# Patient Record
Sex: FEMALE | Race: WHITE | NOT HISPANIC OR LATINO | Employment: FULL TIME | URBAN - METROPOLITAN AREA
[De-identification: names, ages, dates, MRNs, and addresses within clinical notes are randomized per-mention and may not be internally consistent; named-entity substitution may affect disease eponyms.]

---

## 2019-05-16 ENCOUNTER — OFFICE VISIT (OUTPATIENT)
Dept: URGENT CARE | Facility: CLINIC | Age: 34
End: 2019-05-16
Payer: COMMERCIAL

## 2019-05-16 VITALS
HEIGHT: 59 IN | TEMPERATURE: 99 F | WEIGHT: 115 LBS | RESPIRATION RATE: 18 BRPM | DIASTOLIC BLOOD PRESSURE: 98 MMHG | HEART RATE: 89 BPM | BODY MASS INDEX: 23.18 KG/M2 | SYSTOLIC BLOOD PRESSURE: 136 MMHG | OXYGEN SATURATION: 94 %

## 2019-05-16 DIAGNOSIS — J45.21 MILD INTERMITTENT ASTHMA WITH ACUTE EXACERBATION: Primary | ICD-10-CM

## 2019-05-16 DIAGNOSIS — J01.00 ACUTE NON-RECURRENT MAXILLARY SINUSITIS: ICD-10-CM

## 2019-05-16 PROCEDURE — 94640 PR INHAL RX, AIRWAY OBST/DX SPUTUM INDUCT: ICD-10-PCS | Mod: S$GLB,,, | Performed by: FAMILY MEDICINE

## 2019-05-16 PROCEDURE — 94640 AIRWAY INHALATION TREATMENT: CPT | Mod: S$GLB,,, | Performed by: FAMILY MEDICINE

## 2019-05-16 PROCEDURE — 99214 PR OFFICE/OUTPT VISIT, EST, LEVL IV, 30-39 MIN: ICD-10-PCS | Mod: 25,S$GLB,, | Performed by: FAMILY MEDICINE

## 2019-05-16 PROCEDURE — 99214 OFFICE O/P EST MOD 30 MIN: CPT | Mod: 25,S$GLB,, | Performed by: FAMILY MEDICINE

## 2019-05-16 RX ORDER — ALBUTEROL SULFATE 0.83 MG/ML
2.5 SOLUTION RESPIRATORY (INHALATION)
Status: COMPLETED | OUTPATIENT
Start: 2019-05-16 | End: 2019-05-16

## 2019-05-16 RX ORDER — METHYLPREDNISOLONE 4 MG/1
TABLET ORAL
Qty: 21 TABLET | Refills: 0 | Status: SHIPPED | OUTPATIENT
Start: 2019-05-16

## 2019-05-16 RX ORDER — AMOXICILLIN 500 MG/1
1000 TABLET, FILM COATED ORAL EVERY 12 HOURS
Qty: 40 TABLET | Refills: 0 | Status: SHIPPED | OUTPATIENT
Start: 2019-05-16 | End: 2019-05-26

## 2019-05-16 RX ORDER — IPRATROPIUM BROMIDE 0.5 MG/2.5ML
0.5 SOLUTION RESPIRATORY (INHALATION)
Status: COMPLETED | OUTPATIENT
Start: 2019-05-16 | End: 2019-05-16

## 2019-05-16 RX ORDER — BUDESONIDE AND FORMOTEROL FUMARATE DIHYDRATE 160; 4.5 UG/1; UG/1
2 AEROSOL RESPIRATORY (INHALATION) 2 TIMES DAILY
Qty: 1 INHALER | Refills: 0 | Status: SHIPPED | OUTPATIENT
Start: 2019-05-16 | End: 2019-06-15

## 2019-05-16 RX ADMIN — IPRATROPIUM BROMIDE 0.5 MG: 0.5 SOLUTION RESPIRATORY (INHALATION) at 03:05

## 2019-05-16 RX ADMIN — ALBUTEROL SULFATE 2.5 MG: 0.83 SOLUTION RESPIRATORY (INHALATION) at 03:05

## 2019-05-16 NOTE — PATIENT INSTRUCTIONS
Understanding Asthma Triggers  Triggers are things that cause you to have asthma symptoms. Some triggers you can stay away from completely. Others you can plan for and adjust to. Use this sheet to help you know your triggers.    What are triggers?  Triggers irritate your lungs and lead to asthma flare-ups. Some examples are:  · Irritants, such as tobacco smoke or air pollution. These are a concern for all people with asthma.  · Allergens or substances that cause allergies, like pets, dust mites, or pollen  · Special conditions. These include being ill with a cold or the flu, or certain kinds of weather, including changes in weather. These differ from person to person.  · Exercise can trigger asthma in some people. If exercise is one of your triggers, you can learn how to exercise safely.  What triggers your asthma?  Which of these common triggers cause your asthma to flare up? Check all that apply to you.  Irritants:  ? Tobacco smoke (smoking or secondhand smoke)  ? Smoke from fireplaces  ? Vehicle exhaust  ? Smog or air pollution  ? Aerosol sprays  ? Strong odors, such as perfume, incense, or cooking odors  ? Household , such as ammonia or bleach  Allergens:  ? Cats  ? Dogs  ? Birds  ? Dust or dust mites  ? Pollen  ? Mold  ? Cockroaches  Other triggers:  ? Cold air  ? Hot air  ? Weather changes  ? Exercise  ? Certain foods or food ingredients (such as sulfites)  ? Medicines  ? Emotions such as laughing, crying, or feeling stressed  ? Illness such as colds, flu, and sinus infections  Allergies and allergy treatment  People with asthma often have allergies. If you have allergies, or think you have them, talk with your healthcare provider about testing and treatment. Allergy testing can find out exactly which allergens affect you. Types of tests include:  · Skin tests. A small amount of each allergen is put on the skin. Sites are then looked at for an allergic reaction. This could be redness, swelling, or  itching. In general, the greater the reaction, the stronger the allergy.  · Blood tests. A blood test can show sensitivity to the allergen.  Exposing a person to gradually larger amounts of an allergen can help the body build up a tolerance. This is the purpose of allergy shots (immunotherapy). For this therapy, injections are given over a period of years. At first, you get injections with a very small amount of allergen about once a week. As treatment goes on, the amount of allergen is gradually increased to a certain level. Eventually, you have the injections less often. This therapy can take up to a year to start working. But it can be very effective to manage certain allergies over time.   Date Last Reviewed: 10/1/2016  © 7650-0857 [employer]. [address] [place] [address], [place] [address]. All rights reserved. This information is not intended as a substitute for professional medical care. Always follow your healthcare professional's instructions.        Asthma (Adult)  Asthma is a disease where the medium and  small air passages within the lung go into spasm and restrict the flow of air. Inflammation and swelling of the airways cause further restriction. During an acute asthma attack, these factors cause difficulty breathing, wheezing, cough and chest tightness.    An asthma attack can be triggered by many things. Common triggers include infections such as the common cold, bronchitis, pneumonia. Irritants such as smoke or pollutants in the air, emotional upset, and exercise can also trigger an attack. In many adults with asthma, allergies to dust, mold, pollen and animal dander can cause an asthma attack. Skipping doses of daily asthma medicine can also bring on an asthma attack.  Asthma can be controlled using the proper medicines prescribed by your healthcare provider and avoiding exposure to known triggers including allergens and irritants.  Home care  · Take prescribed medicine exactly at the  "times advised. If you need medicine such as from a hand held inhaler or aerosol breathing machine more than every 4 hours, contact your healthcare provider or seek immediate medical attention. If prescribed an antibiotic or prednisone, take all of the medicine as prescribed, even if you are feeling better after a few days.  · Do not smoke. Avoid being exposed to the smoke of others.  · Some people with asthma have worsening of their symptoms when they take aspirin and non-steroidal or fever-reducing medicines like ibuprofen and naproxen. Talk to your healthcare provider if you think this may apply to you.  Follow-up care  Follow up with your healthcare provider, or as advised. Always bring all of your current medicines to any appointments with your healthcare provider. Also bring a complete list of medications even those not taken for asthma. If you do not already have one, talk to your healthcare provider about developing a personalized "Asthma Action Plan."  A pneumococcal (pneumonia) vaccine and yearly flu shot (every fall) are recommended. Ask your doctor about this.  When to seek medical advice  Call your healthcare provider right away if any of these occur:   · Increased wheezing or shortness of breath  · Need to use your inhalers more often than usual without relief  · Fever of 100.4ºF (38ºC) or higher, or as directed by your healthcare provider  · Coughing up lots of dark-colored or bloody sputum (mucus)  · Chest pain with each breath  · If you use a peak flow meter as part of an Asthma Action Plan, and you are still in the yellow zone (50% to 80%) 15 minutes after using inhaler medicine.  Call 911  Call 911 if any of the following occur  · Trouble walking or talking because of shortness of breath  · If you use a peak flow meter as part of an Asthma Action Plan and you are still in the red zone (less than 50%) 15 minutes after using inhaler medicine  · Lips or fingernails turning gray or blue  Date Last " Reviewed: 12/2/2015  © 4745-3882 [employer]. [address] [place] [address], [place] [address]. All rights reserved. This information is not intended as a substitute for professional medical care. Always follow your healthcare professional's instructions.

## 2019-05-16 NOTE — PROGRESS NOTES
"Subjective:       Patient ID: Doreen Camacho is a 33 y.o. female.    Vitals:  height is 4' 11" (1.499 m) and weight is 52.2 kg (115 lb). Her temperature is 98.9 °F (37.2 °C). Her blood pressure is 136/98 (abnormal) and her pulse is 89. Her respiration is 18 and oxygen saturation is 94% (abnormal).     Chief Complaint: Sinus Problem    Patient presents with sinus issue that has turned into cough and chest congestion since last Friday.     Cough   This is a new problem. The current episode started in the past 7 days. The problem has been gradually worsening. The problem occurs constantly. The cough is non-productive. Associated symptoms include headaches. Pertinent negatives include no chills, ear pain, eye redness, fever, hemoptysis, myalgias, rash, sore throat, shortness of breath or wheezing. Nothing aggravates the symptoms. Her past medical history is significant for asthma.       Constitution: Negative for chills, sweating, fatigue and fever.   HENT: Positive for congestion. Negative for ear pain, sore throat and voice change.    Neck: Negative for painful lymph nodes.   Eyes: Negative for eye redness.   Respiratory: Positive for cough. Negative for chest tightness, sputum production, bloody sputum, COPD, shortness of breath, stridor, wheezing and asthma.    Gastrointestinal: Negative for nausea and vomiting.   Musculoskeletal: Negative for muscle ache.   Skin: Negative for rash.   Allergic/Immunologic: Negative for seasonal allergies and asthma.   Neurological: Positive for headaches.   Hematologic/Lymphatic: Negative for swollen lymph nodes.       Objective:      Physical Exam   Constitutional: She appears well-developed and well-nourished.   HENT:   Head: Normocephalic and atraumatic.   Right Ear: External ear normal.   Left Ear: External ear normal.   Mild erythema of pharynx, Turbinate edema and congestion   Eyes: Pupils are equal, round, and reactive to light. Conjunctivae and EOM are normal.   Neck: Normal " range of motion. Neck supple. No thyromegaly present.   Cardiovascular: Normal rate, regular rhythm, normal heart sounds and intact distal pulses.   Pulmonary/Chest: Effort normal.   Decreased breath sounds -no obvious wheezing heard   Lymphadenopathy:     She has no cervical adenopathy.   Psychiatric: She has a normal mood and affect. Her behavior is normal. Judgment and thought content normal.   Nursing note and vitals reviewed.      Assessment:       1. Acute non-recurrent maxillary sinusitis    2. Mild intermittent asthma with acute exacerbation        Plan:         Acute non-recurrent maxillary sinusitis    Mild intermittent asthma with acute exacerbation  -     albuterol nebulizer solution 2.5 mg  -     ipratropium 0.02 % nebulizer solution 0.5 mg  -     amoxicillin (AMOXIL) 500 MG Tab; Take 2 tablets (1,000 mg total) by mouth every 12 (twelve) hours. for 10 days  Dispense: 40 tablet; Refill: 0  -     methylPREDNISolone (MEDROL DOSEPACK) 4 mg tablet; Follow instructions on dosepack.  Dispense: 21 tablet; Refill: 0  -     budesonide-formoterol 160-4.5 mcg (SYMBICORT) 160-4.5 mcg/actuation HFAA; Inhale 2 puffs into the lungs 2 (two) times daily.  Dispense: 1 Inhaler; Refill: 0      Patient Instructions       Understanding Asthma Triggers  Triggers are things that cause you to have asthma symptoms. Some triggers you can stay away from completely. Others you can plan for and adjust to. Use this sheet to help you know your triggers.    What are triggers?  Triggers irritate your lungs and lead to asthma flare-ups. Some examples are:  · Irritants, such as tobacco smoke or air pollution. These are a concern for all people with asthma.  · Allergens or substances that cause allergies, like pets, dust mites, or pollen  · Special conditions. These include being ill with a cold or the flu, or certain kinds of weather, including changes in weather. These differ from person to person.  · Exercise can trigger asthma in some  people. If exercise is one of your triggers, you can learn how to exercise safely.  What triggers your asthma?  Which of these common triggers cause your asthma to flare up? Check all that apply to you.  Irritants:  ? Tobacco smoke (smoking or secondhand smoke)  ? Smoke from fireplaces  ? Vehicle exhaust  ? Smog or air pollution  ? Aerosol sprays  ? Strong odors, such as perfume, incense, or cooking odors  ? Household , such as ammonia or bleach  Allergens:  ? Cats  ? Dogs  ? Birds  ? Dust or dust mites  ? Pollen  ? Mold  ? Cockroaches  Other triggers:  ? Cold air  ? Hot air  ? Weather changes  ? Exercise  ? Certain foods or food ingredients (such as sulfites)  ? Medicines  ? Emotions such as laughing, crying, or feeling stressed  ? Illness such as colds, flu, and sinus infections  Allergies and allergy treatment  People with asthma often have allergies. If you have allergies, or think you have them, talk with your healthcare provider about testing and treatment. Allergy testing can find out exactly which allergens affect you. Types of tests include:  · Skin tests. A small amount of each allergen is put on the skin. Sites are then looked at for an allergic reaction. This could be redness, swelling, or itching. In general, the greater the reaction, the stronger the allergy.  · Blood tests. A blood test can show sensitivity to the allergen.  Exposing a person to gradually larger amounts of an allergen can help the body build up a tolerance. This is the purpose of allergy shots (immunotherapy). For this therapy, injections are given over a period of years. At first, you get injections with a very small amount of allergen about once a week. As treatment goes on, the amount of allergen is gradually increased to a certain level. Eventually, you have the injections less often. This therapy can take up to a year to start working. But it can be very effective to manage certain allergies over time.   Date Last  Reviewed: 10/1/2016  © 4823-1803 [employer]. [address] [place] [address], [place] [address]. All rights reserved. This information is not intended as a substitute for professional medical care. Always follow your healthcare professional's instructions.        Asthma (Adult)  Asthma is a disease where the medium and  small air passages within the lung go into spasm and restrict the flow of air. Inflammation and swelling of the airways cause further restriction. During an acute asthma attack, these factors cause difficulty breathing, wheezing, cough and chest tightness.    An asthma attack can be triggered by many things. Common triggers include infections such as the common cold, bronchitis, pneumonia. Irritants such as smoke or pollutants in the air, emotional upset, and exercise can also trigger an attack. In many adults with asthma, allergies to dust, mold, pollen and animal dander can cause an asthma attack. Skipping doses of daily asthma medicine can also bring on an asthma attack.  Asthma can be controlled using the proper medicines prescribed by your healthcare provider and avoiding exposure to known triggers including allergens and irritants.  Home care  · Take prescribed medicine exactly at the times advised. If you need medicine such as from a hand held inhaler or aerosol breathing machine more than every 4 hours, contact your healthcare provider or seek immediate medical attention. If prescribed an antibiotic or prednisone, take all of the medicine as prescribed, even if you are feeling better after a few days.  · Do not smoke. Avoid being exposed to the smoke of others.  · Some people with asthma have worsening of their symptoms when they take aspirin and non-steroidal or fever-reducing medicines like ibuprofen and naproxen. Talk to your healthcare provider if you think this may apply to you.  Follow-up care  Follow up with your healthcare provider, or as advised. Always bring all of your  "current medicines to any appointments with your healthcare provider. Also bring a complete list of medications even those not taken for asthma. If you do not already have one, talk to your healthcare provider about developing a personalized "Asthma Action Plan."  A pneumococcal (pneumonia) vaccine and yearly flu shot (every fall) are recommended. Ask your doctor about this.  When to seek medical advice  Call your healthcare provider right away if any of these occur:   · Increased wheezing or shortness of breath  · Need to use your inhalers more often than usual without relief  · Fever of 100.4ºF (38ºC) or higher, or as directed by your healthcare provider  · Coughing up lots of dark-colored or bloody sputum (mucus)  · Chest pain with each breath  · If you use a peak flow meter as part of an Asthma Action Plan, and you are still in the yellow zone (50% to 80%) 15 minutes after using inhaler medicine.  Call 911  Call 911 if any of the following occur  · Trouble walking or talking because of shortness of breath  · If you use a peak flow meter as part of an Asthma Action Plan and you are still in the red zone (less than 50%) 15 minutes after using inhaler medicine  · Lips or fingernails turning gray or blue  Date Last Reviewed: 12/2/2015  © 4096-1996 [employer]. [address] [place] [address], [place] [address]. All rights reserved. This information is not intended as a substitute for professional medical care. Always follow your healthcare professional's instructions.             "